# Patient Record
Sex: MALE | Race: WHITE | Employment: OTHER | URBAN - METROPOLITAN AREA
[De-identification: names, ages, dates, MRNs, and addresses within clinical notes are randomized per-mention and may not be internally consistent; named-entity substitution may affect disease eponyms.]

---

## 2017-08-22 ENCOUNTER — OFFICE VISIT (OUTPATIENT)
Dept: FAMILY MEDICINE | Facility: CLINIC | Age: 67
End: 2017-08-22

## 2017-08-22 VITALS
RESPIRATION RATE: 20 BRPM | DIASTOLIC BLOOD PRESSURE: 74 MMHG | HEART RATE: 86 BPM | OXYGEN SATURATION: 98 % | TEMPERATURE: 98.4 F | SYSTOLIC BLOOD PRESSURE: 136 MMHG | WEIGHT: 225 LBS | HEIGHT: 70 IN | BODY MASS INDEX: 32.21 KG/M2

## 2017-08-22 DIAGNOSIS — R30.0 DYSURIA: ICD-10-CM

## 2017-08-22 DIAGNOSIS — N30.01 ACUTE CYSTITIS WITH HEMATURIA: Primary | ICD-10-CM

## 2017-08-22 PROBLEM — Z71.89 COUNSELING REGARDING ADVANCED DIRECTIVES: Status: ACTIVE | Noted: 2017-08-22

## 2017-08-22 LAB
ALBUMIN UR-MCNC: 100 MG/DL
APPEARANCE UR: ABNORMAL
BACTERIA #/AREA URNS HPF: ABNORMAL /HPF
BILIRUB UR QL STRIP: NEGATIVE
COLOR UR AUTO: ABNORMAL
GLUCOSE UR STRIP-MCNC: NEGATIVE MG/DL
HGB UR QL STRIP: ABNORMAL
KETONES UR STRIP-MCNC: 5 MG/DL
LEUKOCYTE ESTERASE UR QL STRIP: ABNORMAL
NITRATE UR QL: NEGATIVE
PH UR STRIP: 6 PH (ref 5–7)
RBC #/AREA URNS AUTO: >182 /HPF (ref 0–2)
SOURCE: ABNORMAL
SP GR UR STRIP: 1.01 (ref 1–1.03)
SQUAMOUS #/AREA URNS AUTO: 5 /HPF (ref 0–1)
UROBILINOGEN UR STRIP-MCNC: 0 MG/DL (ref 0–2)
WBC #/AREA URNS AUTO: >182 /HPF (ref 0–2)
WBC CLUMPS #/AREA URNS HPF: PRESENT /HPF

## 2017-08-22 PROCEDURE — 87086 URINE CULTURE/COLONY COUNT: CPT | Performed by: FAMILY MEDICINE

## 2017-08-22 PROCEDURE — 99203 OFFICE O/P NEW LOW 30 MIN: CPT | Performed by: FAMILY MEDICINE

## 2017-08-22 PROCEDURE — 81001 URINALYSIS AUTO W/SCOPE: CPT | Performed by: FAMILY MEDICINE

## 2017-08-22 RX ORDER — SULFAMETHOXAZOLE/TRIMETHOPRIM 800-160 MG
1 TABLET ORAL 2 TIMES DAILY
Qty: 14 TABLET | Refills: 0 | Status: SHIPPED | OUTPATIENT
Start: 2017-08-22 | End: 2017-08-29

## 2017-08-22 RX ORDER — PHENAZOPYRIDINE HYDROCHLORIDE 200 MG/1
200 TABLET, FILM COATED ORAL 3 TIMES DAILY PRN
Qty: 6 TABLET | Refills: 0 | Status: SHIPPED | OUTPATIENT
Start: 2017-08-22 | End: 2021-04-28

## 2017-08-22 ASSESSMENT — PAIN SCALES - GENERAL: PAINLEVEL: MODERATE PAIN (5)

## 2017-08-22 NOTE — NURSING NOTE
"Chief Complaint   Patient presents with     UTI       Initial Ht 5' 10\" (1.778 m) Estimated body mass index is 40 kg/(m^2) as calculated from the following:    Height as of 8/9/12: 5' 10\" (1.778 m).    Weight as of 8/9/12: 278 lb 12.8 oz (126.5 kg).  Medication Reconciliation: complete      Pt states he does not believe in Blood pressures.  He is just going on and on in the room.  He states he is  to [place].   "

## 2017-08-22 NOTE — MR AVS SNAPSHOT
"              After Visit Summary   2017    Jose A Ramos    MRN: [id number]           Patient Information     Date Of Birth          1950        Visit Information        Provider Department      2017 11:15 AM Dedrick Lechuga MD [facility]        Today's Diagnoses     Acute cystitis with hematuria    -  1    Dysuria           Follow-ups after your visit        Who to contact     If you have questions or need follow up information about today's clinic visit or your schedule please contact [facility] directly at [phone].  Normal or non-critical lab and imaging results will be communicated to you by Revlhart, letter or phone within 4 business days after the clinic has received the results. If you do not hear from us within 7 days, please contact the clinic through [employer] or phone. If you have a critical or abnormal lab result, we will notify you by phone as soon as possible.  Submit refill requests through Dimdim or call your pharmacy and they will forward the refill request to us. Please allow 3 business days for your refill to be completed.          Additional Information About Your Visit        MyChart Information     Dimdim lets you send messages to your doctor, view your test results, renew your prescriptions, schedule appointments and more. To sign up, go to [email or web address] . Click on \"Log in\" on the left side of the screen, which will take you to the Welcome page. Then click on \"Sign up Now\" on the right side of the page.     You will be asked to enter the access code listed below, as well as some personal information. Please follow the directions to create your username and password.     Your access code is: [email or web address]  Expires: 2017  3:27 PM     Your access code will  in 90 days. If you need help or a new code, please call your [facility] or [phone].        Care EveryWhere ID     This is your Care EveryWhere ID. " "This could be used by other organizations to access your [place] medical records  [id number]        Your Vitals Were     Pulse Temperature Respirations Height Pulse Oximetry BMI (Body Mass Index)    86 98.4  F (36.9  C) (Temporal) 20 5' 10\" (1.778 m) 98% 32.28 kg/m2       Blood Pressure from Last 3 Encounters:   08/22/17 136/74   08/09/12 158/102    Weight from Last 3 Encounters:   08/22/17 225 lb (102.1 kg)   08/09/12 278 lb 12.8 oz (126.5 kg)              We Performed the Following     *UA reflex to Microscopic and Culture ([place]; [facility]; [facility]; [facility]; [facility]; [facility]; [place]; [place])     Urine Culture Aerobic Bacterial          Today's Medication Changes          These changes are accurate as of: 8/22/17  3:27 PM.  If you have any questions, ask your nurse or doctor.               Start taking these medicines.        Dose/Directions    phenazopyridine 200 MG tablet   Commonly known as:  PYRIDIUM   Used for:  Acute cystitis with hematuria, Dysuria   Started by:  Dedrick Lechuga MD        Dose:  200 mg   Take 1 tablet (200 mg) by mouth 3 times daily as needed for irritation   Quantity:  6 tablet   Refills:  0       sulfamethoxazole-trimethoprim 800-160 MG per tablet   Commonly known as:  BACTRIM DS   Used for:  Acute cystitis with hematuria, Dysuria   Started by:  Dedrick Lechuga MD        Dose:  1 tablet   Take 1 tablet by mouth 2 times daily for 7 days   Quantity:  14 tablet   Refills:  0            Where to get your medicines      These medications were sent to [facility], [place] - [address]   [address] , [place] [address]     Phone:  [phone]     phenazopyridine 200 MG tablet    sulfamethoxazole-trimethoprim 800-160 MG per tablet                Primary Care Provider    Fvl None       No address on file        Equal Access to Services     ASHISH PALMA AH: Mira Banks, ganga smith, qaybta " bishop romojonh lacey ah. So [facility] [phone].    ATENCIÓN: Si irlanda dietz, tiene a ennis disposición servicios gratuitos de asistencia lingüística. Carito al [phone].    We comply with applicable federal civil rights laws and [place] laws. We do not discriminate on the basis of race, color, national origin, age, disability sex, sexual orientation or gender identity.            Thank you!     Thank you for choosing [facility]  for your care. Our goal is always to provide you with excellent care. Hearing back from our patients is one way we can continue to improve our services. Please take a few minutes to complete the written survey that you may receive in the mail after your visit with us. Thank you!             Your Updated Medication List - Protect others around you: Learn how to safely use, store and throw away your medicines at www.disposemymeds.org.          This list is accurate as of: 8/22/17  3:27 PM.  Always use your most recent med list.                   Brand Name Dispense Instructions for use Diagnosis    phenazopyridine 200 MG tablet    PYRIDIUM    6 tablet    Take 1 tablet (200 mg) by mouth 3 times daily as needed for irritation    Acute cystitis with hematuria, Dysuria       sulfamethoxazole-trimethoprim 800-160 MG per tablet    BACTRIM DS    14 tablet    Take 1 tablet by mouth 2 times daily for 7 days    Acute cystitis with hematuria, Dysuria

## 2017-08-22 NOTE — PROGRESS NOTES
SUBJECTIVE:   Jose A Ramos is a 67 year old male who presents to clinic today for the following health issues:      Genitourinary - Male  Onset: 1 week.     Description:   Dysuria (painful urination): YES  Hematuria (blood in urine): no   Frequency: YES  Are you urinating at night : YES  Hesitancy (delay in urine): YES  Retention (unable to empty): YES  Decrease in urinary flow: YES  Incontinence: YES    Progression of Symptoms:  worsening    Accompanying Signs & Symptoms:  Fever: no   Back/Flank pain: YES  Urethral discharge: no   Testicle lumps/masses/pain: no   Nausea and/or vomiting: no   Abdominal pain: no     History:   History of frequent UTI's: no   History of kidney stones: YES  History of hernias: Yes, going to [place] to get that that fixed.   Personal or Family history of Prostate problems: no  Sexually active: no     Precipitating factors:       Alleviating factors:  OTC products he bought on the internet. He doesn't believe in doctors and hospitals.       Problem list and histories reviewed & adjusted, as indicated.  Additional history: as documented    Reviewed and updated as needed this visit by clinical staff  Tobacco  Allergies  Meds  Problems  Soc Hx      Reviewed and updated as needed this visit by Provider  Allergies  Meds  Problems          Symptoms as noted above.  Patient states he is urinating a dark reddish brown colored urine.  Symptoms as reviewed above.  History in the past of kidney stones, but has been a long time.  He notes that his pain started in the right flank and is now gone.  Pain is all in the penis while he is trying to urinate.  No passage of blood clots.     Patient states he is otherwise healthy with exception of inguinal hernia that he is going to have repaired in [place] as he found a surgeon that will not use mesh for the repair.  He is on no medications but takes a variety of supplements, one of which he is certain will dissolve any kidney stone that  "he may have.      ROS:  10 point ROS of systems including Constitutional, Eyes, Respiratory, Cardiovascular, Gastroenterology, Genitourinary, Integumentary, Muscularskeletal, Psychiatric were all negative except for pertinent positives noted in my HPI.     OBJECTIVE:                                                    /74  Pulse 86  Temp 98.4  F (36.9  C) (Temporal)  Resp 20  Ht 5' 10\" (1.778 m)  Wt 225 lb (102.1 kg)  SpO2 98%  BMI 32.28 kg/m2  Body mass index is 32.28 kg/(m^2).  GENERAL APPEARANCE: healthy, alert and no distress  RESP: lungs clear to auscultation - no rales, rhonchi or wheezes  CV: regular rates and rhythm, normal S1 S2, no S3 or S4 and no murmur, click or rub  ABDOMEN: soft, without hepatosplenomegaly or masses and bowel sounds normal, suprapubic tenderness to palpation.  MS: no CVA tenderness to percussion     Results for orders placed or performed in visit on 08/22/17   *UA reflex to Microscopic and Culture ([place]; [facility]; [facility]; [facility]; [facility]; [facility]; [facility]; [place])   Result Value Ref Range    Color Urine Rosie     Appearance Urine Cloudy     Glucose Urine Negative NEG^Negative mg/dL    Bilirubin Urine Negative NEG^Negative    Ketones Urine 5 (A) NEG^Negative mg/dL    Specific Gravity Urine 1.009 1.003 - 1.035    Blood Urine Moderate (A) NEG^Negative    pH Urine 6.0 5.0 - 7.0 pH    Protein Albumin Urine 100 (A) NEG^Negative mg/dL    Urobilinogen mg/dL 0.0 0.0 - 2.0 mg/dL    Nitrite Urine Negative NEG^Negative    Leukocyte Esterase Urine Large (A) NEG^Negative    Source Unspecified Urine     RBC Urine >182 (H) 0 - 2 /HPF    WBC Urine >182 (H) 0 - 2 /HPF    WBC Clumps Present (A) NEG^Negative /HPF    Bacteria Urine Few (A) NEG^Negative /HPF    Squamous Epithelial /HPF Urine 5 (H) 0 - 1 /HPF               ASSESSMENT/PLAN:                                                        ICD-10-CM    1. Acute cystitis with hematuria N30.01 " sulfamethoxazole-trimethoprim (BACTRIM DS) 800-160 MG per tablet     phenazopyridine (PYRIDIUM) 200 MG tablet   2. Dysuria R30.0 *UA reflex to Microscopic and Culture ([place] and [facility] (except [place] and [place])     *UA reflex to Microscopic and Culture ([place]; [facility]; [facility]; [facility]; [facility]; [facility]; [place]; [place])     Urine Culture Aerobic Bacterial     sulfamethoxazole-trimethoprim (BACTRIM DS) 800-160 MG per tablet     phenazopyridine (PYRIDIUM) 200 MG tablet     CANCELED: *UA reflex to Microscopic and Culture ([facility] [place] (except [place] and [place])     PLAN:  1.  Patient has hematuria.  Differential diagnosis includes both UTI and kidney stone.  Patient does not feel that he has any sort of retained stone and that the dietary supplement he is taking is dissolving the stone.  He also does not health insurance so he does not wish to have CT scan done at this time.  We will treat him for UTI and do urine culture today in the event that his symptoms and UA are more consistent with UTI.  If he continues to have hematuria and his urine culture is negative for bacterial infection, then his next step is to have CT scan and then referral to urology for workup on his hematuria.       Dedrick Lechuga MD   [facility]

## 2017-08-23 DIAGNOSIS — R30.0 DYSURIA: Primary | ICD-10-CM

## 2017-08-23 RX ORDER — HYDROCODONE BITARTRATE AND ACETAMINOPHEN 5; 325 MG/1; MG/1
1 TABLET ORAL EVERY 6 HOURS PRN
Qty: 10 TABLET | Refills: 0 | Status: SHIPPED | OUTPATIENT
Start: 2017-08-23 | End: 2021-04-28

## 2017-08-23 NOTE — TELEPHONE ENCOUNTER
Patient called back. He wants Veena to call him back ASAP. He is frustrated that he hasn't gotten a call back yet. Please try calling [phone] first and then try [phone].   Thank you,  Harmony Taylor   for [facility]

## 2017-08-23 NOTE — TELEPHONE ENCOUNTER
Reason for Call:  Other call back    Detailed comments: Patient stated that he would like one of Dr. Lechuga's covering provider write a script for pain. He stated that he is in so much pain and he cant sleep. He will have his phone on today for someone to call him back to let him know when the medication is ready for .     Phone Number Patient can be reached at: Home number on file [phone] (home)    Best Time: any    Can we leave a detailed message on this number? YES    Call taken on 8/23/2017 at 8:30 AM by Mercy Soler

## 2017-08-23 NOTE — TELEPHONE ENCOUNTER
"Patient was seen yesterday for possible UTI and kidney stones.  He states he was given abx and Pyridium and now the Pyridium has turned his urine red \"like I am pissing out all my blood in my body\".  Patient is informed this can turn his urine orangeish, but patient does not want to listen, it is red like blood.  RN was not going to argue that this is probably blood, so let the conversation go.    Patient states one of his co-workers was going to bring him some Valium so he could sleep at night since he is only able to get about 20 minutes of sleep at a time before waking up from the pain.  He states he was taking ASA and Advil for the pain, but this did not help last night.  He is hoping to get something so he is able to sleep tonight.    Pharmacy is T'd up for provider.  Routing to covering provider to address as the provider that saw him yesterday is out of office.  Veena Abad RN      "

## 2017-08-23 NOTE — TELEPHONE ENCOUNTER
Patient called and info was given.  Thank you,  Harmony Taylor   for [facility]

## 2017-08-23 NOTE — TELEPHONE ENCOUNTER
"Patient is frustrated because he wants pain pills to make the pain go away.  He states they give out pain pills for everything else, why not for UTI.  Patient is informed he will have to wait for the provider to have time to get to his messages and he will address his message.  He states we can leave a message with Abbey if we need to call him back and he is away from his phone.  He would like RN's last name.  RN informs him we do not give that information out over the phone.  \"I see, I see...\"  He says.    Veena Abad, RN    "

## 2017-08-24 LAB
BACTERIA SPEC CULT: NO GROWTH
SPECIMEN SOURCE: NORMAL

## 2017-08-29 ENCOUNTER — TELEPHONE (OUTPATIENT)
Dept: FAMILY MEDICINE | Facility: CLINIC | Age: 67
End: 2017-08-29

## 2017-08-29 DIAGNOSIS — R31.9 URINARY TRACT INFECTION WITH HEMATURIA, SITE UNSPECIFIED: Primary | ICD-10-CM

## 2017-08-29 DIAGNOSIS — N39.0 URINARY TRACT INFECTION WITH HEMATURIA, SITE UNSPECIFIED: Primary | ICD-10-CM

## 2017-08-29 RX ORDER — PHENAZOPYRIDINE HYDROCHLORIDE 200 MG/1
200 TABLET, FILM COATED ORAL 3 TIMES DAILY PRN
Qty: 9 TABLET | Refills: 0 | Status: SHIPPED | OUTPATIENT
Start: 2017-08-29 | End: 2021-04-28

## 2017-08-29 RX ORDER — CIPROFLOXACIN 500 MG/1
500 TABLET, FILM COATED ORAL 2 TIMES DAILY
Qty: 14 TABLET | Refills: 0 | Status: SHIPPED | OUTPATIENT
Start: 2017-08-29 | End: 2021-04-28

## 2017-08-29 NOTE — TELEPHONE ENCOUNTER
Patient is calling to see what the status of this message. Patient is very inpatient and keeps asking when he should call back. Patient kept going on and on about how this is ridiculous and the medication that was given to him before is junk and he hung up. Please advise

## 2017-08-29 NOTE — TELEPHONE ENCOUNTER
Aquiles has called back very impatient, I did explain that he does need to be very patient and understand that our providers do the best they can.  They see many patients' and have many other responsibilities as well and will address his concerns as soon as possible.    I assured him that we were not ignoring him.    Thank you,  Emilia WEATHERS

## 2017-08-29 NOTE — TELEPHONE ENCOUNTER
Please let him know a prescription for Cipro has been sent to the [facility] pharmacy. I have also sent a refill of the Pyridium

## 2017-08-29 NOTE — TELEPHONE ENCOUNTER
Reason for Call:  Medication or medication refill:    Do you use a [facility]?  Name of the pharmacy and phone number for the current request:  [facility] - [phone]    Name of the medication requested: abx    Other request: patient is calling stating that he would like a different abx for the uti. States that he is still having burning, and pain, patient requesting to have this addressed by Dr. Lechuga's partners     Can we leave a detailed message on this number? YES    Phone number patient can be reached at: Home number on file [phone] (home)    Best Time: any    Call taken on 8/29/2017 at 8:47 AM by Danyell Daniel

## 2021-04-28 ENCOUNTER — HOSPITAL ENCOUNTER (EMERGENCY)
Facility: CLINIC | Age: 71
Discharge: HOME | End: 2021-04-28
Attending: FAMILY MEDICINE | Admitting: FAMILY MEDICINE
Payer: MEDICARE

## 2021-04-28 VITALS
WEIGHT: 216.2 LBS | OXYGEN SATURATION: 100 % | SYSTOLIC BLOOD PRESSURE: 154 MMHG | TEMPERATURE: 98.1 F | BODY MASS INDEX: 31.02 KG/M2 | HEART RATE: 87 BPM | RESPIRATION RATE: 16 BRPM | DIASTOLIC BLOOD PRESSURE: 88 MMHG

## 2021-04-28 DIAGNOSIS — N39.0 COMPLICATED URINARY TRACT INFECTION: ICD-10-CM

## 2021-04-28 LAB
ALBUMIN UR-MCNC: 100 MG/DL
APPEARANCE UR: ABNORMAL
BILIRUB UR QL STRIP: NEGATIVE
COLOR UR AUTO: YELLOW
GLUCOSE UR STRIP-MCNC: NEGATIVE MG/DL
HGB UR QL STRIP: ABNORMAL
KETONES UR STRIP-MCNC: NEGATIVE MG/DL
LEUKOCYTE ESTERASE UR QL STRIP: ABNORMAL
NITRATE UR QL: NEGATIVE
PH UR STRIP: 6 PH (ref 5–7)
RBC #/AREA URNS AUTO: 20 /HPF (ref 0–2)
SOURCE: ABNORMAL
SP GR UR STRIP: 1.01 (ref 1–1.03)
UROBILINOGEN UR STRIP-MCNC: 0 MG/DL (ref 0–2)
WBC #/AREA URNS AUTO: >182 /HPF (ref 0–5)
WBC CLUMPS #/AREA URNS HPF: PRESENT /HPF

## 2021-04-28 PROCEDURE — 81001 URINALYSIS AUTO W/SCOPE: CPT | Performed by: FAMILY MEDICINE

## 2021-04-28 PROCEDURE — 87491 CHLMYD TRACH DNA AMP PROBE: CPT | Performed by: FAMILY MEDICINE

## 2021-04-28 PROCEDURE — 99283 EMERGENCY DEPT VISIT LOW MDM: CPT | Performed by: FAMILY MEDICINE

## 2021-04-28 PROCEDURE — 99284 EMERGENCY DEPT VISIT MOD MDM: CPT | Performed by: FAMILY MEDICINE

## 2021-04-28 PROCEDURE — 87591 N.GONORRHOEAE DNA AMP PROB: CPT | Performed by: FAMILY MEDICINE

## 2021-04-28 PROCEDURE — 87086 URINE CULTURE/COLONY COUNT: CPT | Performed by: FAMILY MEDICINE

## 2021-04-28 RX ORDER — CIPROFLOXACIN 500 MG/1
500 TABLET, FILM COATED ORAL 2 TIMES DAILY
Qty: 20 TABLET | Refills: 0 | Status: SHIPPED | OUTPATIENT
Start: 2021-04-28 | End: 2021-05-08

## 2021-04-28 NOTE — ED TRIAGE NOTES
He has burning in his penis and milky urine for the past 2 weeks.  He has been taking nutritional supplements and it's not helping.

## 2021-04-28 NOTE — ED PROVIDER NOTES
History     Chief Complaint   Patient presents with     Rule out Urinary Tract Infection     HPI  Jose A Ramos is a 71 year old male who presents with concerns of dysuria and a penile discharge has been going on for the past 2 weeks.  Patient states that when he goes to the bathroom it is pretty uncomfortable and then afterwards he will get a milky type of discharge from his penis.  Denies any abdominal pain, denies any hematuria.  Denies any fevers or chills.  Denies any nausea, vomiting or diarrhea.  Patient is single and last had unprotected sex and [place] a number of months ago.  He does not have a history of any prostate issues as far as he knows.    Allergies:  No Known Allergies    Problem List:    Patient Active Problem List    Diagnosis Date Noted     Counseling regarding advanced directives 08/22/2017     Priority: Medium     Hypertension goal BP (blood pressure) < 140/90 02/28/2013     Priority: Medium        Past Medical History:    No past medical history on file.    Past Surgical History:    No past surgical history on file.    Family History:    No family history on file.    Social History:  Marital Status:  Unknown [6]  Social History     Tobacco Use     Smoking status: Former Smoker     Years: 5.00     Types: Cigarettes, Cigars     Smokeless tobacco: Never Used     Tobacco comment: Smokes about 1 cigar every 2-3 weeks. 3 cigs per day   Substance Use Topics     Alcohol use: No     Drug use: No        Medications:    No current outpatient medications on file.        Review of Systems   All other systems reviewed and are negative.      Physical Exam   BP: (!) 154/88  Pulse: 87  Temp: 98.1  F (36.7  C)  Resp: 16  Weight: 98.1 kg (216 lb 3.2 oz)  SpO2: 100 %      Physical Exam  Vitals signs and nursing note reviewed.   Constitutional:       General: He is not in acute distress.     Appearance: Normal appearance. He is not ill-appearing.   Abdominal:      Hernia: A hernia is present. Hernia is  present in the right inguinal area.   Genitourinary:     Pubic Area: No rash.       Penis: Circumcised. No erythema, tenderness or discharge.       Testes: Normal.         Right: Tenderness not present.         Left: Tenderness not present.      Epididymis:      Right: Normal.      Left: Normal.       Neurological:      Mental Status: He is alert.         ED Course        Procedures      Results for orders placed or performed during the hospital encounter of 04/28/21 (from the past 24 hour(s))   UA with Microscopic   Result Value Ref Range    Color Urine Yellow     Appearance Urine Cloudy     Glucose Urine Negative NEG^Negative mg/dL    Bilirubin Urine Negative NEG^Negative    Ketones Urine Negative NEG^Negative mg/dL    Specific Gravity Urine 1.009 1.003 - 1.035    Blood Urine Moderate (A) NEG^Negative    pH Urine 6.0 5.0 - 7.0 pH    Protein Albumin Urine 100 (A) NEG^Negative mg/dL    Urobilinogen mg/dL 0.0 0.0 - 2.0 mg/dL    Nitrite Urine Negative NEG^Negative    Leukocyte Esterase Urine Moderate (A) NEG^Negative    Source Unspecified Urine     WBC Urine >182 (H) 0 - 5 /HPF    RBC Urine 20 (H) 0 - 2 /HPF    WBC Clumps Present (A) NEG^Negative /HPF       Medications - No data to display     Urine shows findings worrisome for pretty significant infection.  I did check gonorrhea and chlamydia test but I think this is less likely but patient has had unprotected sex before.  We will go ahead and discharge the patient home on a course of ciprofloxacin.  I do have a urine culture pending.  Patient will follow up in clinic in 2 weeks if everything clears up for repeat urine, will be seen sooner if things do worsen.    Assessments & Plan (with Medical Decision Making)  Urinary tract infection     I have reviewed the nursing notes.    I have reviewed the findings, diagnosis, plan and need for follow up with the patient.              4/28/2021   [facility] EMERGENCY DEPT     Sabino Clark,  MD  04/28/21 0940

## 2021-04-29 LAB
BACTERIA SPEC CULT: NO GROWTH
C TRACH DNA SPEC QL NAA+PROBE: NEGATIVE
Lab: NORMAL
N GONORRHOEA DNA SPEC QL NAA+PROBE: NEGATIVE
SPECIMEN SOURCE: NORMAL